# Patient Record
Sex: FEMALE | Race: WHITE | NOT HISPANIC OR LATINO | URBAN - METROPOLITAN AREA
[De-identification: names, ages, dates, MRNs, and addresses within clinical notes are randomized per-mention and may not be internally consistent; named-entity substitution may affect disease eponyms.]

---

## 2024-06-28 NOTE — PROGRESS NOTES
Patient ID: Pattie Domingo is a 74 y.o. female.  Primary Care Provider: Waylon Montgomery MD    Subjective      Ovarian carcinoma diagnosed May 2010  PRIOR TREATMENTS:   1)  Debulking surgery to include total abdominal hysterectomy, bilateral salpingo-oophorectomy,omentectomy, ileal resection with reanastomosis.   2)  6 cycles of Taxol and Carboplatin chemotherapy adjuvantly.      Objective    Visit Vitals  /80 (BP Location: Right arm, Patient Position: Sitting, BP Cuff Size: Adult)   Pulse 88   Temp 36.5 °C (97.7 °F) (Temporal)   Resp 16        Interval history:  Patient is a 74 year old female that is s/p total abdominal hysterectomy, bilateral salpingo-oophorectomy, omentectomy, ileal resection with reanastomosis and 6 cycles of Taxol and Carboplatin chemotherapy adjuvantly for Papillary Serous Stage III Ovarian cancer, tx done 5/27/2010.  Here for annual exam.  Patient had nasal polyps removed this last year, breathing much better.  Patient denies any vaginal bleeding, pink tinged discharge. Patient denies any constipation or diarrhea.  Appetite has been good.  Energy levels are baseline.  Patient denies hematuria.  Patient denies urinary leakage or incontinence. Patient is not currently sexually active. Using triamcinolone a few times a week.       Physical Exam:    Constitutional: Doing well. SHELBI  Eyes: PERRL  ENMT: Moist mucus membranes  Head/Neck: Supple. Symmetrical  Cardiovascular: Regular, rate and rhythm. 2+ equal pulses of the extremities  Respiratory/Thorax: CTA. RRR. Chest rise symmetrical.  Gastrointestinal: Non-distended, soft, non-tender  Genitourinary:   Normal external female genitalia. No vulvar lesions noted  Speculum exam: Smooth vaginal walls without lesions or masses. Vaginal cuff visualized without lesions.   Bimanual exam: Smooth vaginal wall without lesions or masses.  Surgically absent uterus, cervix, and adnexa.    Rectovaginal exam: smooth rectovaginal septum without lesions or  masses  Musculoskeletal: ROM intact, no joint swelling, normal strength  Extremities: No edema  Neurological: Alert and oriented x 3. Pleasant and cooperative.  Lymphatic: No lymphadenopathy. No lymphedema  Psychological: Appropriate mood and behavior  Skin: Warm and dry, no lesions, no rashes    A complete review of systems was performed and all systems were normal except what is noted in the interval history.      Assessment/Plan   Patient is a 74 year old female that is s/p total abdominal hysterectomy, bilateral salpingo-oophorectomy, omentectomy, ileal resection with reanastomosis and 6 cycles of Taxol and Carboplatin chemotherapy adjuvantly for Papillary Serous Stage III Ovarian cancer, tx done 5/27/2010.  SHELBI 14 years.       PLAN:  F/U in 1 year or as needed  RX for Estrace sent, intravaginally three nights a week, using dime to nickel size amount on finger and applying at specified area.  Do not use applicator.   Physical examination was within normal limits today.  She is currently SHELBI.  We reviewed signs and symptoms of possible recurrence with the patient and she will call our office should she experience any of these.

## 2024-07-02 ENCOUNTER — OFFICE VISIT (OUTPATIENT)
Dept: GYNECOLOGIC ONCOLOGY | Facility: CLINIC | Age: 74
End: 2024-07-02
Payer: MEDICARE

## 2024-07-02 VITALS
TEMPERATURE: 97.7 F | DIASTOLIC BLOOD PRESSURE: 80 MMHG | WEIGHT: 162.26 LBS | OXYGEN SATURATION: 96 % | BODY MASS INDEX: 26.2 KG/M2 | RESPIRATION RATE: 16 BRPM | SYSTOLIC BLOOD PRESSURE: 126 MMHG | HEART RATE: 88 BPM

## 2024-07-02 DIAGNOSIS — L90.0 LICHEN SCLEROSUS ET ATROPHICUS: Primary | ICD-10-CM

## 2024-07-02 PROCEDURE — 1126F AMNT PAIN NOTED NONE PRSNT: CPT | Performed by: NURSE PRACTITIONER

## 2024-07-02 PROCEDURE — 1159F MED LIST DOCD IN RCRD: CPT | Performed by: NURSE PRACTITIONER

## 2024-07-02 PROCEDURE — 99213 OFFICE O/P EST LOW 20 MIN: CPT | Performed by: NURSE PRACTITIONER

## 2024-07-02 RX ORDER — HYDROCHLOROTHIAZIDE 12.5 MG/1
12.5 TABLET ORAL
COMMUNITY
Start: 2023-08-23

## 2024-07-02 RX ORDER — ONDANSETRON 4 MG/1
TABLET, FILM COATED ORAL
COMMUNITY
Start: 2024-02-22

## 2024-07-02 RX ORDER — MONTELUKAST SODIUM 10 MG/1
1 TABLET ORAL NIGHTLY
COMMUNITY
Start: 2023-09-05

## 2024-07-02 RX ORDER — ROSUVASTATIN CALCIUM 10 MG/1
10 TABLET, COATED ORAL
COMMUNITY
Start: 2023-08-23

## 2024-07-02 RX ORDER — FLUTICASONE PROPIONATE 93 UG/1
1 SPRAY, METERED NASAL 2 TIMES DAILY
COMMUNITY
Start: 2024-04-16

## 2024-07-02 RX ORDER — EPINASTINE HYDROCHLORIDE 0.5 MG/ML
1 SOLUTION/ DROPS OPHTHALMIC EVERY 12 HOURS PRN
COMMUNITY
Start: 2023-09-22

## 2024-07-02 RX ORDER — TRIAMCINOLONE ACETONIDE 1 MG/G
OINTMENT TOPICAL 2 TIMES DAILY
Qty: 15 G | Refills: 3 | Status: SHIPPED | OUTPATIENT
Start: 2024-07-02

## 2024-07-02 ASSESSMENT — ENCOUNTER SYMPTOMS
DEPRESSION: 0
OCCASIONAL FEELINGS OF UNSTEADINESS: 0
LOSS OF SENSATION IN FEET: 0

## 2024-07-02 ASSESSMENT — PATIENT HEALTH QUESTIONNAIRE - PHQ9
2. FEELING DOWN, DEPRESSED OR HOPELESS: NOT AT ALL
1. LITTLE INTEREST OR PLEASURE IN DOING THINGS: NOT AT ALL
SUM OF ALL RESPONSES TO PHQ9 QUESTIONS 1 AND 2: 0

## 2024-07-02 ASSESSMENT — PAIN SCALES - GENERAL: PAINLEVEL: 0-NO PAIN

## 2024-07-02 ASSESSMENT — COLUMBIA-SUICIDE SEVERITY RATING SCALE - C-SSRS
6. HAVE YOU EVER DONE ANYTHING, STARTED TO DO ANYTHING, OR PREPARED TO DO ANYTHING TO END YOUR LIFE?: NO
1. IN THE PAST MONTH, HAVE YOU WISHED YOU WERE DEAD OR WISHED YOU COULD GO TO SLEEP AND NOT WAKE UP?: NO
2. HAVE YOU ACTUALLY HAD ANY THOUGHTS OF KILLING YOURSELF?: NO

## 2025-07-08 ENCOUNTER — APPOINTMENT (OUTPATIENT)
Dept: GYNECOLOGIC ONCOLOGY | Facility: CLINIC | Age: 75
End: 2025-07-08
Payer: MEDICARE

## 2025-07-18 NOTE — PROGRESS NOTES
Patient ID: Pattie Domingo is a 75 y.o. female.  Primary Care Provider: Waylon Montgomery MD    Subjective      Ovarian carcinoma diagnosed May 2010  PRIOR TREATMENTS:   1)  Debulking surgery to include total abdominal hysterectomy, bilateral salpingo-oophorectomy,omentectomy, ileal resection with reanastomosis.   2)  6 cycles of Taxol and Carboplatin chemotherapy adjuvantly.      Objective    Visit Vitals  /77 (BP Location: Right arm, Patient Position: Sitting, BP Cuff Size: Adult)   Pulse 75   Temp 36.4 °C (97.5 °F) (Temporal)   Resp 16          Interval history:  Patient is a 75 year old female that is s/p total abdominal hysterectomy, bilateral salpingo-oophorectomy, omentectomy, ileal resection with reanastomosis and 6 cycles of Taxol and Carboplatin chemotherapy adjuvantly for Papillary Serous Stage III Ovarian cancer, tx done 5/27/2010.  Here for annual exam.  Patient denies any vaginal bleeding, pink tinged discharge. Patient denies any constipation or diarrhea.  Appetite has been good.  Energy levels are baseline.  Patient denies hematuria.  Patient denies urinary leakage or incontinence. Mammogram is up to date.  Patient is not currently sexually active. Seeing pulmonology for dyspnea when walking dogs.       Physical Exam:    Constitutional: Doing well. SHELBI  Eyes: PERRL  ENMT: Moist mucus membranes  Head/Neck: Supple. Symmetrical  Cardiovascular: Regular, rate and rhythm. 2+ equal pulses of the extremities  Respiratory/Thorax: CTA. RRR. Chest rise symmetrical.  Gastrointestinal: Non-distended, soft, non-tender  Genitourinary:   Normal external female genitalia. No vulvar lesions noted  Speculum exam: Smooth vaginal walls without lesions or masses. Vaginal cuff visualized without lesions.   Bimanual exam: Smooth vaginal wall without lesions or masses.  Surgically absent uterus, cervix, and adnexa.    Rectovaginal exam: smooth rectovaginal septum without lesions or masses  Musculoskeletal: ROM  intact, no joint swelling, normal strength  Extremities: No edema  Neurological: Alert and oriented x 3. Pleasant and cooperative.  Lymphatic: No lymphadenopathy. No lymphedema  Psychological: Appropriate mood and behavior  Skin: Warm and dry, no lesions, no rashes    A complete review of systems was performed and all systems were normal except what is noted in the interval history.      Assessment/Plan   Patient is a 75 year old female that is s/p total abdominal hysterectomy, bilateral salpingo-oophorectomy, omentectomy, ileal resection with reanastomosis and 6 cycles of Taxol and Carboplatin chemotherapy adjuvantly for Papillary Serous Stage III Ovarian cancer, tx done 5/27/2010. SHELBI 15 years.        PLAN:  F/U in 1 year or as needed  Physical examination was within normal limits today.  She is currently SHELBI.  We reviewed signs and symptoms of possible recurrence with the patient and she will call our office should she experience any of these.

## 2025-07-22 ENCOUNTER — OFFICE VISIT (OUTPATIENT)
Dept: GYNECOLOGIC ONCOLOGY | Facility: CLINIC | Age: 75
End: 2025-07-22
Payer: MEDICARE

## 2025-07-22 VITALS
HEART RATE: 75 BPM | TEMPERATURE: 97.5 F | BODY MASS INDEX: 25.77 KG/M2 | SYSTOLIC BLOOD PRESSURE: 121 MMHG | WEIGHT: 159.61 LBS | RESPIRATION RATE: 16 BRPM | DIASTOLIC BLOOD PRESSURE: 77 MMHG | OXYGEN SATURATION: 94 %

## 2025-07-22 DIAGNOSIS — C56.9 MALIGNANT NEOPLASM OF OVARY, UNSPECIFIED LATERALITY (MULTI): Primary | ICD-10-CM

## 2025-07-22 PROCEDURE — 1126F AMNT PAIN NOTED NONE PRSNT: CPT | Performed by: NURSE PRACTITIONER

## 2025-07-22 PROCEDURE — 1036F TOBACCO NON-USER: CPT | Performed by: NURSE PRACTITIONER

## 2025-07-22 PROCEDURE — 1159F MED LIST DOCD IN RCRD: CPT | Performed by: NURSE PRACTITIONER

## 2025-07-22 PROCEDURE — 99213 OFFICE O/P EST LOW 20 MIN: CPT | Performed by: NURSE PRACTITIONER

## 2025-07-22 ASSESSMENT — PAIN SCALES - GENERAL: PAINLEVEL_OUTOF10: 0-NO PAIN

## 2026-07-14 ENCOUNTER — APPOINTMENT (OUTPATIENT)
Dept: GYNECOLOGIC ONCOLOGY | Facility: CLINIC | Age: 76
End: 2026-07-14
Payer: MEDICARE